# Patient Record
Sex: MALE | Race: WHITE | NOT HISPANIC OR LATINO | URBAN - METROPOLITAN AREA
[De-identification: names, ages, dates, MRNs, and addresses within clinical notes are randomized per-mention and may not be internally consistent; named-entity substitution may affect disease eponyms.]

---

## 2018-04-17 ENCOUNTER — OFFICE VISIT - RIVER FALLS (OUTPATIENT)
Dept: FAMILY MEDICINE | Facility: CLINIC | Age: 28
End: 2018-04-17

## 2018-04-17 ASSESSMENT — MIFFLIN-ST. JEOR: SCORE: 1809.21

## 2022-02-11 VITALS
BODY MASS INDEX: 25.06 KG/M2 | HEART RATE: 72 BPM | OXYGEN SATURATION: 99 % | WEIGHT: 185 LBS | DIASTOLIC BLOOD PRESSURE: 68 MMHG | HEIGHT: 72 IN | SYSTOLIC BLOOD PRESSURE: 100 MMHG

## 2022-02-15 NOTE — PROGRESS NOTES
Patient:   MIGUEL COUCH            MRN: [id number]            FIN: [id number]               Age:   28 years     Sex:  Male     :  1990   Associated Diagnoses:   Ulcerative colitis; ED (erectile dysfunction); Nocturia   Author:   Jevon Campbell MD      Chief Complaint   2018 10:56 AM CDT   pt here for med refills      History of Present Illness   see chief complaint as noted above and confirmed with the patient     28 year old male presents today for general follow up, he is in need of medication refills. He is a  and he moves around every six months, he has been in [place] and [place].     Erectile Dysfunction: He has had testing done such as testosterone and other tests and all have came back normal. He was prescribed Cialis at 20mg, he takes half a tablet at a time and it works well for him, he denies any concerns with the medication. He takes it to a [place] pharmacy to have it filled as it is cheaper.     Ulcerative Colitis: He goes to [facility] for general follow up with this, he is currently on Mesalamine and when he last saw GI in 2017 his labs were looking good. He denies any recent bowel concerns or abdominal pain.     Nocturia: He is on Oxybutinin and Terazosin as he was urinating five times a night, he saw urology and was put on these medications, he feels they are working well for him and he denies concerns at this time. Denies painful urination, denies frequent urination, denies dry mouth, denies blood in urine.       Review of Systems   Constitutional:  No fever, No chills, No fatigue.    Respiratory:  No shortness of breath, No cough.    Cardiovascular:  No chest pain.    Gastrointestinal:  No nausea, No vomiting, No diarrhea, No constipation, No abdominal pain.    Genitourinary:  No dysuria, No hematuria.    Musculoskeletal:  No back pain.    Integumentary:  No rash.    Neurologic:  Alert and oriented X4.       Health Status   Allergies:    Allergic Reactions  (Selected)  No Known Medication Allergies   Medications:  (Selected)   Prescriptions  Prescribed  Cialis 20 mg oral tablet: 1 tab(s) ( 20 mg ), po, daily, # 4 tab(s), 11 Refill(s), Type: Maintenance  terazosin 10 mg oral capsule: See Instructions, Instructions: TAKE 1 CAPSULE BY MOUTH AT BEDTIME, # 90 cap(s), 3 Refill(s), Type: Soft Stop, Pharmacy: [id number]   Problem list:    All Problems  Ulcerative colitis / SNOMED CT 492899284 / Confirmed  Resolved: History of chicken pox / SNOMED CT [id number]--[id number]      Histories   Past Medical History:    Active  Ulcerative colitis ([id number])  Resolved  History of chicken pox ([id number]--[id number]):  Resolved.   Family History:    Entire family history is negative.   Procedure history:    Colonoscopy ([id number]) on 10/16/2015 at 25 Years.  Comments:  10/22/2015 1:39 PM - Jana Quiroz  Sedation:  Midazolam 4mg, Fentanyl 200mcg  Indication: Bloody Diarrhea  10cm, 20cm, 30cm, 40cm, 50cm colon bxs consistent with Ulcerative Colitis.    Referral to GI made.  wisdom teeth extracted.   Social History:        Alcohol Assessment            Current, 1-2 times per month, 6 drinks/episode average.  10 drinks/episode maximum.      Tobacco Assessment            Past, Cigarettes, 3 year(s).                     Comments:                      09/26/2014 - Chleo CMA, Chris                     PT quit 2008      Employment and Education Assessment            Employed, Work/School description: Seasonal .      Home and Environment Assessment            Marital status: Single.      Exercise and Physical Activity Assessment            Exercise frequency: 3-5 times per week.        Physical Examination   Vital Signs   4/17/2018 10:56 AM CDT Peripheral Pulse Rate 72 bpm    Pulse Site Radial artery    Systolic Blood Pressure 100 mmHg    Diastolic Blood Pressure 68 mmHg    Mean Arterial Pressure 79 mmHg    BP Site Right arm     Oxygen Saturation 99 %      Measurements from flowsheet : Measurements   4/17/2018 10:56 AM CDT Height Measured - Standard 71.5 in    Weight Measured - Standard 185 lb    BSA 2.06 m2    Body Mass Index 25.44 kg/m2  HI      General:  Alert and oriented, No acute distress, He seems to be a very athletic person, he works outside, is a  in a few different states. His blood pressure is great along with his pulse and Oxygen level. .    Eye:  Pupils are equal, round and reactive to light, Normal conjunctiva.    HENT:  Oral mucosa is moist.    Neck:  Supple.    Respiratory:  Respirations are non-labored.    Cardiovascular:  Normal rate, Regular rhythm, No edema.    Gastrointestinal:  Non-distended.    Musculoskeletal:  Normal gait.    Integumentary:  Warm, No rash.    Neurologic:  Alert, Oriented.    Psychiatric:  Cooperative, Appropriate mood & affect, Normal judgment.       Review / Management   Results review      Impression and Plan       Diagnosis     Ulcerative colitis (DJZ35-QT K51.811).     ED (erectile dysfunction) (LVI41-FK N52.9).     Nocturia (OOB74-SC R35.1).     Plan:  Refilled medications, no concerns at this time.     He will follow up with GI as scheduled.     .    Velia MONET Medical Assistant acted solely as a scribe for, and in presence of Dr. Jevon Campbell who performed the services.

## 2022-02-15 NOTE — TELEPHONE ENCOUNTER
Entered by Belén Alarcon CMA on June 05, 2019 11:38:09 AM CDT  Date of last office visit and reason:  4-17-18      Date of last Med Check / Px:     Date of last labs pertaining to med:  BMP 9-21-16    Note:  2 week protocol given as he was given protocol last month and letter sent.          ------------------------------------------  From: [facility] [id number]  To: Jevon Campbell MD  Sent: Rain 3, 2019 10:42:52 AM CDT  Subject: Medication Management  Due: June 4, 2019 10:42:52 AM CDT    ** On Hold Pending Signature **  Drug: oxybutynin (oxybutynin 5 mg/24 hours oral tablet, extended release)  1 TAB(S) ORAL DAILY  Quantity: 30 tab(s)     Days Supply: 0         Refills: 0  Substitutions Allowed  Notes from Pharmacy: Pt due for annual exam for further refills.    Dispensed Drug: oxybutynin (oxybutynin 5 mg/24 hours oral tablet, extended release)  TAKE 1 TABLET BY MOUTH EVERY DAY  Quantity: 30 tab(s)     Days Supply: 30        Refills: 0  Substitutions Allowed  Notes from Pharmacy:   ---------------------------------------------------------------  From: Belén Alarcon CMA   To: [facility] [id number]    Sent: 6/5/2019 11:38:33 AM CDT  Subject: Medication Management     ** Submitted: **  Order:oxybutynin (oxybutynin 5 mg/24 hours oral tablet, extended release)  1 tab(s)  Oral  daily  Qty:  14 tab(s)        Refills:  0          Substitutions Allowed     Route To Pharmacy - [facility] [id number]    Signed by Belén Alarcon CMA  6/5/2019 11:38:00 AM    ** Submitted: **  Complete:oxybutynin (oxybutynin 5 mg/24 hours oral tablet, extended release)   Signed by Belén Alarcon CMA  6/5/2019 11:38:00 AM    ** Not Approved:  **  oxybutynin (OXYBUTYNIN ER 5MG TABLETS)  TAKE 1 TABLET BY MOUTH EVERY DAY  Qty:  30 tab(s)        Days Supply:  30        Refills:  0          Substitutions Allowed     Route To Pharmacy - [facility] [id number]   Signed by Belén Alarcon CMA

## 2022-02-15 NOTE — TELEPHONE ENCOUNTER
Entered by Chris Whitney CMA on May 06, 2019 2:43:38 PM CDT  ---------------------  From: Chris Whitney CMA   To: [facility] [id number]    Sent: 5/6/2019 2:43:37 PM CDT  Subject: Medication Management     ** Submitted: **  Order:oxybutynin (oxybutynin 5 mg/24 hours oral tablet, extended release)  1 tab(s)  Oral  daily  Qty:  30 tab(s)        Refills:  0          Substitutions Allowed     Route To [facility] [id number]    Signed by Chris Whitney CMA  5/6/2019 2:43:00 PM    ** Submitted: **  Complete:oxybutynin (oxybutynin 5 mg/24 hours oral tablet, extended release)   Signed by Chris Whitney CMA  5/6/2019 2:43:00 PM    ** Not Approved:  **  oxybutynin (OXYBUTYNIN ER 5MG TABLETS)  TAKE 1 TABLET BY MOUTH DAILY  Qty:  90 tab(s)        Days Supply:  90        Refills:  0          Substitutions Allowed     Route To [facility] [id number]   Signed by Chris Whitney CMA            ------------------------------------------  From: [facility] [id number]  To: Jevon Campbell MD  Sent: May 4, 2019 6:33:38 PM CDT  Subject: Medication Management  Due: May 5, 2019 6:33:38 PM CDT    ** On Hold Pending Signature **  Drug: oxybutynin (oxybutynin 5 mg/24 hours oral tablet, extended release)  1 TAB(S) PO DAILY  Quantity: 90 unknown unit  Days Supply: 0         Refills: 2  Substitutions Allowed  Notes from Pharmacy:     Dispensed Drug: oxybutynin (oxybutynin 5 mg/24 hours oral tablet, extended release)  TAKE 1 TABLET BY MOUTH DAILY  Quantity: 90 tab(s)     Days Supply: 90        Refills: 0  Substitutions Allowed  Notes from Pharmacy:   ------------------------------------------Med Refill      Date of last office visit and reason:  4-17-18      Date of last Med Check / Px:     Date of last labs pertaining to med:  BMP 9-21-16    Note:  Rx filled per protocol.  Chris Whitney [facility]    RTC order in chart:  yes    For Protocol refill, has patient been contacted:  _

## 2022-02-15 NOTE — LETTER
(Inserted Image. Unable to display)       April 18, 2019      MIGUEL COUCH   [address] [place] [id number]          Dear MIGUEL,      Thank you for selecting [facility] for your healthcare needs.     Our records indicate you are due for the following services:     Medication Check    To schedule an appointment or if you have further questions, please contact your primary clinic:   [facility]       [phone]   [facility]       [phone]              [facility]     [phone]    Powered by [employer]    Sincerely,    Jevon Campbell MD